# Patient Record
Sex: MALE | Race: BLACK OR AFRICAN AMERICAN | NOT HISPANIC OR LATINO
[De-identification: names, ages, dates, MRNs, and addresses within clinical notes are randomized per-mention and may not be internally consistent; named-entity substitution may affect disease eponyms.]

---

## 2019-02-11 ENCOUNTER — APPOINTMENT (OUTPATIENT)
Dept: HUMAN REPRODUCTION | Facility: CLINIC | Age: 49
End: 2019-02-11

## 2019-05-13 ENCOUNTER — APPOINTMENT (OUTPATIENT)
Dept: HUMAN REPRODUCTION | Facility: CLINIC | Age: 49
End: 2019-05-13
Payer: COMMERCIAL

## 2019-05-13 PROCEDURE — 89322 SEMEN ANAL STRICT CRITERIA: CPT

## 2019-05-13 PROCEDURE — 89259 CRYOPRESERVATION SPERM: CPT

## 2024-07-19 ENCOUNTER — EMERGENCY (EMERGENCY)
Facility: HOSPITAL | Age: 54
LOS: 0 days | Discharge: HOME | End: 2024-07-19
Attending: FAMILY MEDICINE
Payer: COMMERCIAL

## 2024-07-19 VITALS
DIASTOLIC BLOOD PRESSURE: 61 MMHG | TEMPERATURE: 97 F | OXYGEN SATURATION: 99 % | SYSTOLIC BLOOD PRESSURE: 110 MMHG | HEART RATE: 59 BPM

## 2024-07-19 VITALS
DIASTOLIC BLOOD PRESSURE: 60 MMHG | SYSTOLIC BLOOD PRESSURE: 127 MMHG | TEMPERATURE: 98 F | RESPIRATION RATE: 18 BRPM | HEIGHT: 68 IN | HEART RATE: 65 BPM | OXYGEN SATURATION: 95 % | WEIGHT: 229.94 LBS

## 2024-07-19 DIAGNOSIS — H53.149 VISUAL DISCOMFORT, UNSPECIFIED: ICD-10-CM

## 2024-07-19 DIAGNOSIS — R51.9 HEADACHE, UNSPECIFIED: ICD-10-CM

## 2024-07-19 DIAGNOSIS — R42 DIZZINESS AND GIDDINESS: ICD-10-CM

## 2024-07-19 DIAGNOSIS — G43.909 MIGRAINE, UNSPECIFIED, NOT INTRACTABLE, WITHOUT STATUS MIGRAINOSUS: ICD-10-CM

## 2024-07-19 LAB
ABO RH CONFIRMATION: SIGNIFICANT CHANGE UP
ALBUMIN SERPL ELPH-MCNC: 3.8 G/DL — SIGNIFICANT CHANGE UP (ref 3.3–5)
ALP SERPL-CCNC: 50 U/L — SIGNIFICANT CHANGE UP (ref 40–120)
ALT FLD-CCNC: 23 U/L — SIGNIFICANT CHANGE UP (ref 12–78)
AMPHET UR-MCNC: NEGATIVE — SIGNIFICANT CHANGE UP
ANION GAP SERPL CALC-SCNC: 6 MMOL/L — SIGNIFICANT CHANGE UP (ref 5–17)
APPEARANCE UR: CLEAR — SIGNIFICANT CHANGE UP
APTT BLD: 25.9 SEC — SIGNIFICANT CHANGE UP (ref 24.5–35.6)
AST SERPL-CCNC: 33 U/L — SIGNIFICANT CHANGE UP (ref 15–37)
BARBITURATES UR SCN-MCNC: NEGATIVE — SIGNIFICANT CHANGE UP
BASOPHILS # BLD AUTO: 0.07 K/UL — SIGNIFICANT CHANGE UP (ref 0–0.2)
BASOPHILS NFR BLD AUTO: 0.9 % — SIGNIFICANT CHANGE UP (ref 0–2)
BENZODIAZ UR-MCNC: NEGATIVE — SIGNIFICANT CHANGE UP
BILIRUB SERPL-MCNC: 0.5 MG/DL — SIGNIFICANT CHANGE UP (ref 0.2–1.2)
BILIRUB UR-MCNC: NEGATIVE — SIGNIFICANT CHANGE UP
BLD GP AB SCN SERPL QL: SIGNIFICANT CHANGE UP
BUN SERPL-MCNC: 13 MG/DL — SIGNIFICANT CHANGE UP (ref 7–23)
CALCIUM SERPL-MCNC: 8.8 MG/DL — SIGNIFICANT CHANGE UP (ref 8.5–10.1)
CHLORIDE SERPL-SCNC: 104 MMOL/L — SIGNIFICANT CHANGE UP (ref 96–108)
CO2 SERPL-SCNC: 23 MMOL/L — SIGNIFICANT CHANGE UP (ref 22–31)
COCAINE METAB.OTHER UR-MCNC: NEGATIVE — SIGNIFICANT CHANGE UP
COLOR SPEC: YELLOW — SIGNIFICANT CHANGE UP
CREAT SERPL-MCNC: 1.15 MG/DL — SIGNIFICANT CHANGE UP (ref 0.5–1.3)
DIFF PNL FLD: NEGATIVE — SIGNIFICANT CHANGE UP
EGFR: 77 ML/MIN/1.73M2 — SIGNIFICANT CHANGE UP
EOSINOPHIL # BLD AUTO: 0.16 K/UL — SIGNIFICANT CHANGE UP (ref 0–0.5)
EOSINOPHIL NFR BLD AUTO: 1.9 % — SIGNIFICANT CHANGE UP (ref 0–6)
ETHANOL SERPL-MCNC: <10 MG/DL — SIGNIFICANT CHANGE UP (ref 0–10)
FENTANYL UR QL SCN: NEGATIVE — SIGNIFICANT CHANGE UP
GLUCOSE SERPL-MCNC: 135 MG/DL — HIGH (ref 70–99)
GLUCOSE UR QL: NEGATIVE MG/DL — SIGNIFICANT CHANGE UP
HCT VFR BLD CALC: 45.2 % — SIGNIFICANT CHANGE UP (ref 39–50)
HGB BLD-MCNC: 15.4 G/DL — SIGNIFICANT CHANGE UP (ref 13–17)
IMM GRANULOCYTES NFR BLD AUTO: 0.2 % — SIGNIFICANT CHANGE UP (ref 0–0.9)
INR BLD: 1.02 RATIO — SIGNIFICANT CHANGE UP (ref 0.85–1.18)
KETONES UR-MCNC: NEGATIVE MG/DL — SIGNIFICANT CHANGE UP
LEUKOCYTE ESTERASE UR-ACNC: NEGATIVE — SIGNIFICANT CHANGE UP
LYMPHOCYTES # BLD AUTO: 1.88 K/UL — SIGNIFICANT CHANGE UP (ref 1–3.3)
LYMPHOCYTES # BLD AUTO: 22.9 % — SIGNIFICANT CHANGE UP (ref 13–44)
MCHC RBC-ENTMCNC: 29.3 PG — SIGNIFICANT CHANGE UP (ref 27–34)
MCHC RBC-ENTMCNC: 34.1 GM/DL — SIGNIFICANT CHANGE UP (ref 32–36)
MCV RBC AUTO: 85.9 FL — SIGNIFICANT CHANGE UP (ref 80–100)
METHADONE UR-MCNC: NEGATIVE — SIGNIFICANT CHANGE UP
MONOCYTES # BLD AUTO: 0.64 K/UL — SIGNIFICANT CHANGE UP (ref 0–0.9)
MONOCYTES NFR BLD AUTO: 7.8 % — SIGNIFICANT CHANGE UP (ref 2–14)
NEUTROPHILS # BLD AUTO: 5.45 K/UL — SIGNIFICANT CHANGE UP (ref 1.8–7.4)
NEUTROPHILS NFR BLD AUTO: 66.3 % — SIGNIFICANT CHANGE UP (ref 43–77)
NITRITE UR-MCNC: NEGATIVE — SIGNIFICANT CHANGE UP
OPIATES UR-MCNC: NEGATIVE — SIGNIFICANT CHANGE UP
PCP SPEC-MCNC: SIGNIFICANT CHANGE UP
PCP UR-MCNC: NEGATIVE — SIGNIFICANT CHANGE UP
PH UR: 8 — SIGNIFICANT CHANGE UP (ref 5–8)
PLATELET # BLD AUTO: 265 K/UL — SIGNIFICANT CHANGE UP (ref 150–400)
POTASSIUM SERPL-MCNC: 3.8 MMOL/L — SIGNIFICANT CHANGE UP (ref 3.5–5.3)
POTASSIUM SERPL-SCNC: 3.8 MMOL/L — SIGNIFICANT CHANGE UP (ref 3.5–5.3)
PROT SERPL-MCNC: 8 GM/DL — SIGNIFICANT CHANGE UP (ref 6–8.3)
PROT UR-MCNC: NEGATIVE MG/DL — SIGNIFICANT CHANGE UP
PROTHROM AB SERPL-ACNC: 11.5 SEC — SIGNIFICANT CHANGE UP (ref 9.5–13)
RBC # BLD: 5.26 M/UL — SIGNIFICANT CHANGE UP (ref 4.2–5.8)
RBC # FLD: 12.3 % — SIGNIFICANT CHANGE UP (ref 10.3–14.5)
SODIUM SERPL-SCNC: 133 MMOL/L — LOW (ref 135–145)
SP GR SPEC: >1.03 — HIGH (ref 1–1.03)
THC UR QL: NEGATIVE — SIGNIFICANT CHANGE UP
TROPONIN I, HIGH SENSITIVITY RESULT: <3 NG/L — SIGNIFICANT CHANGE UP
UROBILINOGEN FLD QL: 1 MG/DL — SIGNIFICANT CHANGE UP (ref 0.2–1)
WBC # BLD: 8.22 K/UL — SIGNIFICANT CHANGE UP (ref 3.8–10.5)
WBC # FLD AUTO: 8.22 K/UL — SIGNIFICANT CHANGE UP (ref 3.8–10.5)

## 2024-07-19 PROCEDURE — 86850 RBC ANTIBODY SCREEN: CPT

## 2024-07-19 PROCEDURE — 93010 ELECTROCARDIOGRAM REPORT: CPT

## 2024-07-19 PROCEDURE — 99285 EMERGENCY DEPT VISIT HI MDM: CPT | Mod: 25

## 2024-07-19 PROCEDURE — 84484 ASSAY OF TROPONIN QUANT: CPT

## 2024-07-19 PROCEDURE — 85610 PROTHROMBIN TIME: CPT

## 2024-07-19 PROCEDURE — 70498 CT ANGIOGRAPHY NECK: CPT | Mod: MC

## 2024-07-19 PROCEDURE — 36415 COLL VENOUS BLD VENIPUNCTURE: CPT

## 2024-07-19 PROCEDURE — 96374 THER/PROPH/DIAG INJ IV PUSH: CPT | Mod: XU

## 2024-07-19 PROCEDURE — 86900 BLOOD TYPING SEROLOGIC ABO: CPT

## 2024-07-19 PROCEDURE — 70498 CT ANGIOGRAPHY NECK: CPT | Mod: 26,MC

## 2024-07-19 PROCEDURE — 80053 COMPREHEN METABOLIC PANEL: CPT

## 2024-07-19 PROCEDURE — 71045 X-RAY EXAM CHEST 1 VIEW: CPT | Mod: 26

## 2024-07-19 PROCEDURE — 86901 BLOOD TYPING SEROLOGIC RH(D): CPT

## 2024-07-19 PROCEDURE — 99285 EMERGENCY DEPT VISIT HI MDM: CPT

## 2024-07-19 PROCEDURE — 80307 DRUG TEST PRSMV CHEM ANLYZR: CPT

## 2024-07-19 PROCEDURE — 96375 TX/PRO/DX INJ NEW DRUG ADDON: CPT | Mod: XU

## 2024-07-19 PROCEDURE — 85025 COMPLETE CBC W/AUTO DIFF WBC: CPT

## 2024-07-19 PROCEDURE — 70496 CT ANGIOGRAPHY HEAD: CPT | Mod: MC

## 2024-07-19 PROCEDURE — 93005 ELECTROCARDIOGRAM TRACING: CPT

## 2024-07-19 PROCEDURE — 70496 CT ANGIOGRAPHY HEAD: CPT | Mod: 26,MC

## 2024-07-19 PROCEDURE — 85730 THROMBOPLASTIN TIME PARTIAL: CPT

## 2024-07-19 PROCEDURE — 82962 GLUCOSE BLOOD TEST: CPT

## 2024-07-19 PROCEDURE — 71045 X-RAY EXAM CHEST 1 VIEW: CPT

## 2024-07-19 PROCEDURE — 81003 URINALYSIS AUTO W/O SCOPE: CPT

## 2024-07-19 RX ORDER — METOCLOPRAMIDE 5 MG/5ML
10 SOLUTION ORAL ONCE
Refills: 0 | Status: COMPLETED | OUTPATIENT
Start: 2024-07-19 | End: 2024-07-19

## 2024-07-19 RX ORDER — SODIUM CHLORIDE 0.9 % (FLUSH) 0.9 %
3 SYRINGE (ML) INJECTION ONCE
Refills: 0 | Status: COMPLETED | OUTPATIENT
Start: 2024-07-19 | End: 2024-07-19

## 2024-07-19 RX ORDER — ACETAMINOPHEN 325 MG
1000 TABLET ORAL ONCE
Refills: 0 | Status: COMPLETED | OUTPATIENT
Start: 2024-07-19 | End: 2024-07-19

## 2024-07-19 RX ORDER — SODIUM CHLORIDE 0.9 % (FLUSH) 0.9 %
1000 SYRINGE (ML) INJECTION ONCE
Refills: 0 | Status: COMPLETED | OUTPATIENT
Start: 2024-07-19 | End: 2024-07-19

## 2024-07-19 RX ADMIN — METOCLOPRAMIDE 10 MILLIGRAM(S): 5 SOLUTION ORAL at 15:24

## 2024-07-19 RX ADMIN — Medication 3 MILLILITER(S): at 15:13

## 2024-07-19 RX ADMIN — Medication 400 MILLIGRAM(S): at 17:26

## 2024-07-19 RX ADMIN — Medication 1000 MILLILITER(S): at 15:24

## 2024-07-19 RX ADMIN — Medication 1000 MILLILITER(S): at 17:00

## 2024-07-22 LAB — GLUCOSE BLDC GLUCOMTR-MCNC: 125 MG/DL — HIGH (ref 70–99)

## 2024-08-05 ENCOUNTER — APPOINTMENT (OUTPATIENT)
Dept: NEUROLOGY | Facility: CLINIC | Age: 54
End: 2024-08-05

## 2024-08-05 PROBLEM — Z83.49 FAMILY HISTORY OF THYROID DISEASE: Status: ACTIVE | Noted: 2024-08-05

## 2024-08-05 PROBLEM — G47.33 OBSTRUCTIVE SLEEP APNEA SYNDROME: Status: ACTIVE | Noted: 2024-08-05

## 2024-08-05 PROBLEM — Z82.49 FAMILY HISTORY OF ESSENTIAL HYPERTENSION: Status: ACTIVE | Noted: 2024-08-05

## 2024-08-05 PROBLEM — G43.109 MIGRAINE WITH VERTIGO: Status: ACTIVE | Noted: 2024-08-05

## 2024-08-05 PROCEDURE — 99204 OFFICE O/P NEW MOD 45 MIN: CPT

## 2024-08-05 NOTE — DATA REVIEWED
[FreeTextEntry1] :   ACC: [id number]     EXAM:  CT ANGIO BRAIN (W)AW IC   ORDERED BY: KAYLAH MATUTE  ACC: [id number]     EXAM:  CT ANGIO NECK (W)AW IC   ORDERED BY: KAYLAH MATUTE  PROCEDURE DATE:  07/19/2024    INTERPRETATION:  Two examinations were performed on this patient: 1. CT Angiography of the carotid arteries with IV contrast (and without, if performed) 2. CT Angiography of the intracranial circulation with IV contrast (and without, if performed)  CLINICAL INFORMATION:    CVA/STROKE   severe vertigo  JCT  TECHNIQUE (both examinations):   Initial noncontrast CT was obtained to the head.  CT angiography images at 1 mm thickness were acquired from the skull base to the skull vertex as a single helical acquisition.   Images were acquired during rapid bolus intravenous administration.  This data set was reconstructed sagittal and coronal images.  3D MIP reconstruction images were obtained.    Post processing angiographic reconstruction of images was performed. This data set was  reconstructed  and reviewed in multiple projections to evaluate carotid morphology and intracranial vessels.   The magnitude of stenosis was evaluated based on the diameter of an intact distal of the internal carotid artery using NASCET criteria. CONTRAST:    90 cc administered  ;  0 cc discarded DOSE INFORMATION:   This scan was performed using automatic exposure control (radiation dose reduction software) to obtain a diagnostic image quality scan with patient dose as low as reasonably achievable.   Total DLP for this examination is estimated at 979 mGy-cm.  COMPARISON:    None  FINDINGS:  CAROTID CIRCULATION  The thoracic aortic arch appears intact.  The great vessel origins remain patent.  The right carotid circulation demonstrates an intact common carotid artery.  The carotid bulb is intact  without hemodynamically significant stenosis.  The internal carotid is patent to the skull base.  The left carotid circulation demonstrates an intact common carotid artery.  The carotid bulb is intact  without hemodynamically significant stenosis.  The internal carotid is patent to the skull base.  The vertebral arteries are patent.  The degree of vertebral asymmetry is within physiologic limits. Each vertebral artery ascends in the neck with near constant caliber.  INTRACRANIAL CIRCULATION  The ANTERIOR circulation demonstrates intact inflow from the ascending cervical segment to the petrous segment of each internal carotid artery. The cavernous and clinoid segments appear intact.   The ophthalmic arteries are demonstrated as patent vessels on each side.    The anterior cerebral arteries are patent and symmetric.  An anterior communicating artery is present. The anterior cerebral arterial A2 segments are patent to peripheral branching The right middle cerebral artery demonstrates an intact initial M1 segment and patent peripheral anterior and posterior division sylvian branches.  The left middle cerebral artery demonstrates an intact initial M1 segment and patent peripheral anterior and posterior division sylvian branches.  The POSTERIOR circulation demonstrates intact inflow from each vertebral artery.   The vertebral arteries are near symmetric in caliber.    PICA arteries are patent on each side.  The basilar artery appears intact.  Superior cerebellar arteries are demonstrated.  There is fetal origin of right posterior cerebral artery from the ipsilateral internal carotid artery (typically incidental developmental variant). Small caliber right T1 segment is present. On the left a small caliber posterior communicating artery is present.   Each posterior cerebral artery is patent to peripheral branching.  The principal dural venous sinuses are patent.  This includes the superior sagittal sinus anterior and posterior limbs.  Each transverse sinus is patent to sigmoid sinuses and patent jugular veins.  There is mild asymmetric right cavernous sinus enhancement. The superior ophthalmic veins are not dilated.  No intracranial aneurysm or vascular malformation is identified.  BRAIN:   No hemorrhage mass or infarction is identified.  CSF spaces appear intact.  ADDITIONAL FINDINGS:  The cervical spine demonstrates shallow reversal of the cervical lordosis apex near C4. Degenerative disc disease including disc material and ventral canal osteophytes compromise of ventral cord surface to the right of midline at C5-C6 and to the left of midline at C6-C7. There is widespread degenerative foraminal stenosis in the lower cervical intervertebral disc levels. There are osteoarthritic changes at the articulation of the anterior ring of C1 and the odontoid process of C2.  This arthropathy includes marginal osteophytes, subchondral sclerosis and joint space narrowing..   IMPRESSION:  1.   Right carotid system:    No hemodynamically significant stenosis.  2.   Left carotid system:     No hemodynamically significant stenosis.  3.   Vertebral circulation:    Patent.  4.  Anterior intracranial circulation:     Unremarkable.  5.  Posterior intracranial circulation:    Unremarkable.  6.  No large vessel occlusion.  7. Brain:   No infarct is identified.  --- End of Report ---      LESLIE BRIONES MD; Attending Radiologist This document has been electronically signed. Jul 19 2024  3:48PM

## 2024-08-05 NOTE — HISTORY OF PRESENT ILLNESS
[Headache] : headache [Dizziness] : dizziness [Nausea] : nausea [Photophobia] : photophobia [Phonophobia] : phonophobia [Neck Pain] : neck pain [Weakness] : weakness [< 4 hours] : < 4 hours [FreeTextEntry1] : 54-year-old man retired , reports since the beginning of July of this year, has been complaining of frequent headaches daily headache described as mild more as a pressure usually behind the eyes can be there when he wakes up occur throughout the day, will take an occasional Tylenol tends to go away.  Occasional associated with a sensation of lightheadedness even dizziness.  In July for the first time his had 2 severe headaches more on the left side of the head, starts as a pressure buildup in intensity, feels like his headaches loading pressure, followed by vertigo, nausea light and sound sensitivity.  Can last a couple hours has tried over-the-counter medications and help, recently given sumatriptan 25 mg which he said helped a little bit.  They can return during the day. History of postural, intermittent vertigo started few years ago, rare episodes, usually unassociated with headaches. Recently went to [facility] emergency room, had a CT scan of the head CT angiogram of the head and neck which were unremarkable. He is also seen primary care, cardiology, ENT, recently started on antihypertensive medications antilipid's. Reports a family history of headaches through his mother.

## 2024-08-05 NOTE — PHYSICAL EXAM
[General Appearance - Alert] : alert [General Appearance - In No Acute Distress] : in no acute distress [Oriented To Time, Place, And Person] : oriented to person, place, and time [Impaired Insight] : insight and judgment were intact [Affect] : the affect was normal [Person] : oriented to person [Place] : oriented to place [Time] : oriented to time [Concentration Intact] : normal concentrating ability [Visual Intact] : visual attention was ~T not ~L decreased [Naming Objects] : no difficulty naming common objects [Repeating Phrases] : no difficulty repeating a phrase [Writing A Sentence] : no difficulty writing a sentence [Fluency] : fluency intact [Comprehension] : comprehension intact [Reading] : reading intact [Past History] : adequate knowledge of personal past history [Cranial Nerves Optic (II)] : visual acuity intact bilaterally,  visual fields full to confrontation, pupils equal round and reactive to light [Cranial Nerves Oculomotor (III)] : extraocular motion intact [Cranial Nerves Trigeminal (V)] : facial sensation intact symmetrically [Cranial Nerves Facial (VII)] : face symmetrical [Cranial Nerves Vestibulocochlear (VIII)] : hearing was intact bilaterally [Cranial Nerves Glossopharyngeal (IX)] : tongue and palate midline [Cranial Nerves Accessory (XI - Cranial And Spinal)] : head turning and shoulder shrug symmetric [Cranial Nerves Hypoglossal (XII)] : there was no tongue deviation with protrusion [Motor Tone] : muscle tone was normal in all four extremities [Motor Strength] : muscle strength was normal in all four extremities [No Muscle Atrophy] : normal bulk in all four extremities [Motor Handedness Right-Handed] : the patient is right hand dominant [Sensation Tactile Decrease] : light touch was intact [Abnormal Walk] : normal gait [Balance] : balance was intact [PERRL With Normal Accommodation] : pupils were equal in size, round, reactive to light, with normal accommodation [Sclera] : the sclera and conjunctiva were normal [Extraocular Movements] : extraocular movements were intact [Optic Disc Abnormality] : the optic disc were normal in size and color [Full Visual Field] : full visual field [Neck Appearance] : the appearance of the neck was normal [] : the neck was supple [Neck Cervical Mass (___cm)] : no neck mass was observed [Arterial Pulses Carotid] : carotid pulses were normal with no bruits [Paresis Pronator Drift Right-Sided] : no pronator drift on the right [Paresis Pronator Drift Left-Sided] : no pronator drift on the left [Past-pointing] : there was no past-pointing [Tremor] : no tremor present [Dysdiadochokinesia Bilaterally] : not present [Coordination - Dysmetria Impaired Finger-to-Nose Bilateral] : not present

## 2024-08-05 NOTE — HISTORY OF PRESENT ILLNESS
[Headache] : headache [Dizziness] : dizziness [Nausea] : nausea [Photophobia] : photophobia [Phonophobia] : phonophobia [Neck Pain] : neck pain [Weakness] : weakness [< 4 hours] : < 4 hours [FreeTextEntry1] : 54-year-old man retired , reports since the beginning of July of this year, has been complaining of frequent headaches daily headache described as mild more as a pressure usually behind the eyes can be there when he wakes up occur throughout the day, will take an occasional Tylenol tends to go away.  Occasional associated with a sensation of lightheadedness even dizziness.  In July for the first time his had 2 severe headaches more on the left side of the head, starts as a pressure buildup in intensity, feels like his headaches loading pressure, followed by vertigo, nausea light and sound sensitivity.  Can last a couple hours has tried over-the-counter medications and help, recently given sumatriptan 25 mg which he said helped a little bit.  They can return during the day. History of postural, intermittent vertigo started few years ago, rare episodes, usually unassociated with headaches. Recently went to [facility] emergency room, had a CT scan of the head CT angiogram of the head and neck which were unremarkable. He is also seen primary care, cardiology, ENT, recently started on antihypertensive medications antilipid's. Reports a family history of headaches through his mother.

## 2024-08-05 NOTE — ASSESSMENT
[FreeTextEntry1] : 54-year-old man with new onset headaches, some of these headaches associated with vertigo, light sensitivity, nausea, consistent with migraine, migraine with aura. Recent imaging unremarkable. Reviewed and discussed treatment options. Plan: Start amitriptyline 25 mg p.o. nightly. Will change the dose of sumatriptan to 100 mg p.o. as needed headache, can repeat in 2 hours. Reviewed and discussed trigger management, headache management. Advised to maintain headache diary. Return to the office, 6 to 8 weeks.

## 2024-08-05 NOTE — DATA REVIEWED
[FreeTextEntry1] :   ACC: [id number]     EXAM:  CT ANGIO BRAIN (W)AW IC   ORDERED BY: KAYLAH MATUTE  ACC: [id number]     EXAM:  CT ANGIO NECK (W)AW IC   ORDERED BY: KAYLAH MATUTE  PROCEDURE DATE:  07/19/2024    INTERPRETATION:  Two examinations were performed on this patient: 1. CT Angiography of the carotid arteries with IV contrast (and without, if performed) 2. CT Angiography of the intracranial circulation with IV contrast (and without, if performed)  CLINICAL INFORMATION:    CVA/STROKE   severe vertigo  JCT  TECHNIQUE (both examinations):   Initial noncontrast CT was obtained to the head.  CT angiography images at 1 mm thickness were acquired from the skull base to the skull vertex as a single helical acquisition.   Images were acquired during rapid bolus intravenous administration.  This data set was reconstructed sagittal and coronal images.  3D MIP reconstruction images were obtained.    Post processing angiographic reconstruction of images was performed. This data set was  reconstructed  and reviewed in multiple projections to evaluate carotid morphology and intracranial vessels.   The magnitude of stenosis was evaluated based on the diameter of an intact distal of the internal carotid artery using NASCET criteria. CONTRAST:    90 cc administered  ;  0 cc discarded DOSE INFORMATION:   This scan was performed using automatic exposure control (radiation dose reduction software) to obtain a diagnostic image quality scan with patient dose as low as reasonably achievable.   Total DLP for this examination is estimated at 979 mGy-cm.  COMPARISON:    None  FINDINGS:  CAROTID CIRCULATION  The thoracic aortic arch appears intact.  The great vessel origins remain patent.  The right carotid circulation demonstrates an intact common carotid artery.  The carotid bulb is intact  without hemodynamically significant stenosis.  The internal carotid is patent to the skull base.  The left carotid circulation demonstrates an intact common carotid artery.  The carotid bulb is intact  without hemodynamically significant stenosis.  The internal carotid is patent to the skull base.  The vertebral arteries are patent.  The degree of vertebral asymmetry is within physiologic limits. Each vertebral artery ascends in the neck with near constant caliber.  INTRACRANIAL CIRCULATION  The ANTERIOR circulation demonstrates intact inflow from the ascending cervical segment to the petrous segment of each internal carotid artery. The cavernous and clinoid segments appear intact.   The ophthalmic arteries are demonstrated as patent vessels on each side.    The anterior cerebral arteries are patent and symmetric.  An anterior communicating artery is present. The anterior cerebral arterial A2 segments are patent to peripheral branching The right middle cerebral artery demonstrates an intact initial M1 segment and patent peripheral anterior and posterior division sylvian branches.  The left middle cerebral artery demonstrates an intact initial M1 segment and patent peripheral anterior and posterior division sylvian branches.  The POSTERIOR circulation demonstrates intact inflow from each vertebral artery.   The vertebral arteries are near symmetric in caliber.    PICA arteries are patent on each side.  The basilar artery appears intact.  Superior cerebellar arteries are demonstrated.  There is fetal origin of right posterior cerebral artery from the ipsilateral internal carotid artery (typically incidental developmental variant). Small caliber right T1 segment is present. On the left a small caliber posterior communicating artery is present.   Each posterior cerebral artery is patent to peripheral branching.  The principal dural venous sinuses are patent.  This includes the superior sagittal sinus anterior and posterior limbs.  Each transverse sinus is patent to sigmoid sinuses and patent jugular veins.  There is mild asymmetric right cavernous sinus enhancement. The superior ophthalmic veins are not dilated.  No intracranial aneurysm or vascular malformation is identified.  BRAIN:   No hemorrhage mass or infarction is identified.  CSF spaces appear intact.  ADDITIONAL FINDINGS:  The cervical spine demonstrates shallow reversal of the cervical lordosis apex near C4. Degenerative disc disease including disc material and ventral canal osteophytes compromise of ventral cord surface to the right of midline at C5-C6 and to the left of midline at C6-C7. There is widespread degenerative foraminal stenosis in the lower cervical intervertebral disc levels. There are osteoarthritic changes at the articulation of the anterior ring of C1 and the odontoid process of C2.  This arthropathy includes marginal osteophytes, subchondral sclerosis and joint space narrowing..   IMPRESSION:  1.   Right carotid system:    No hemodynamically significant stenosis.  2.   Left carotid system:     No hemodynamically significant stenosis.  3.   Vertebral circulation:    Patent.  4.  Anterior intracranial circulation:     Unremarkable.  5.  Posterior intracranial circulation:    Unremarkable.  6.  No large vessel occlusion.  7. Brain:   No infarct is identified.  --- End of Report ---      LESLIE BRIONES MD; Attending Radiologist This document has been electronically signed. Jul 19 2024  3:48PM

## 2024-11-04 ENCOUNTER — RX RENEWAL (OUTPATIENT)
Age: 54
End: 2024-11-04

## 2025-01-30 ENCOUNTER — RX RENEWAL (OUTPATIENT)
Age: 55
End: 2025-01-30

## 2025-03-21 ENCOUNTER — APPOINTMENT (OUTPATIENT)
Dept: NEUROLOGY | Facility: CLINIC | Age: 55
End: 2025-03-21